# Patient Record
Sex: MALE | Race: BLACK OR AFRICAN AMERICAN | NOT HISPANIC OR LATINO | URBAN - METROPOLITAN AREA
[De-identification: names, ages, dates, MRNs, and addresses within clinical notes are randomized per-mention and may not be internally consistent; named-entity substitution may affect disease eponyms.]

---

## 2021-07-09 ENCOUNTER — EMERGENCY (EMERGENCY)
Facility: HOSPITAL | Age: 36
LOS: 1 days | Discharge: HOME | End: 2021-07-09
Admitting: EMERGENCY MEDICINE
Payer: OTHER GOVERNMENT

## 2021-07-09 VITALS
OXYGEN SATURATION: 97 % | DIASTOLIC BLOOD PRESSURE: 68 MMHG | SYSTOLIC BLOOD PRESSURE: 108 MMHG | TEMPERATURE: 98 F | HEART RATE: 71 BPM | RESPIRATION RATE: 18 BRPM

## 2021-07-09 VITALS
RESPIRATION RATE: 18 BRPM | HEIGHT: 72.83 IN | OXYGEN SATURATION: 99 % | WEIGHT: 187.39 LBS | HEART RATE: 74 BPM | DIASTOLIC BLOOD PRESSURE: 79 MMHG | TEMPERATURE: 98 F | SYSTOLIC BLOOD PRESSURE: 145 MMHG

## 2021-07-09 DIAGNOSIS — M54.5 LOW BACK PAIN: ICD-10-CM

## 2021-07-09 PROCEDURE — 99284 EMERGENCY DEPT VISIT MOD MDM: CPT

## 2021-07-09 PROCEDURE — 99283 EMERGENCY DEPT VISIT LOW MDM: CPT | Mod: 25

## 2021-07-09 PROCEDURE — 96372 THER/PROPH/DIAG INJ SC/IM: CPT | Mod: XU

## 2021-07-09 RX ORDER — IBUPROFEN 200 MG
1 TABLET ORAL
Qty: 15 | Refills: 0
Start: 2021-07-09 | End: 2021-07-13

## 2021-07-09 RX ORDER — CYCLOBENZAPRINE HYDROCHLORIDE 10 MG/1
1 TABLET, FILM COATED ORAL
Qty: 15 | Refills: 0
Start: 2021-07-09 | End: 2021-07-13

## 2021-07-09 RX ORDER — KETOROLAC TROMETHAMINE 30 MG/ML
30 SYRINGE (ML) INJECTION ONCE
Refills: 0 | Status: DISCONTINUED | OUTPATIENT
Start: 2021-07-09 | End: 2021-07-09

## 2021-07-09 RX ORDER — CYCLOBENZAPRINE HYDROCHLORIDE 10 MG/1
10 TABLET, FILM COATED ORAL ONCE
Refills: 0 | Status: COMPLETED | OUTPATIENT
Start: 2021-07-09 | End: 2021-07-09

## 2021-07-09 RX ADMIN — CYCLOBENZAPRINE HYDROCHLORIDE 10 MILLIGRAM(S): 10 TABLET, FILM COATED ORAL at 14:27

## 2021-07-09 RX ADMIN — Medication 30 MILLIGRAM(S): at 14:27

## 2021-07-09 NOTE — ED PROVIDER NOTE - PATIENT PORTAL LINK FT
You can access the FollowMyHealth Patient Portal offered by [facility] by registering at the following website: [email or web address]. By joining [employer]’s FollowMyHealth portal, you will also be able to view your health information using other applications (apps) compatible with our system.

## 2021-07-09 NOTE — ED PROVIDER NOTE - CLINICAL SUMMARY MEDICAL DECISION MAKING FREE TEXT BOX
35 healthy M p/w atraumatic back pain x one month, worse with movement.  no red flag sxs. + reproducible lumbar paraspinal ttp on exam, neg SLR and NVI.  likely lumbar strain.  improved with NSAID/flexeril, will dc with rxs and instructed to f/u outpt

## 2021-07-09 NOTE — ED PROVIDER NOTE - NSFOLLOWUPINSTRUCTIONS_ED_ALL_ED_FT
Take tylenol 650mg or motrin 600mg for pain every 4-6 hours.  You can also take flexeril (muscle relaxer) as prescribed for pain but do not drive/operate heavy machinery as it can make you drowsy    You can apply ice and heat to help decreased pain as well.    Call to make appointment with your primary care doctor within one week    Back Pain    Back pain is very common in adults. The cause of back pain is rarely dangerous and the pain often gets better over time. The cause of your back pain may not be known and may include strain of muscles or ligaments, degeneration of the spinal disks, or arthritis. Occasionally the pain may radiate down your leg(s). Over-the-counter medicines to reduce pain and inflammation are often the most helpful. Stretching and remaining active frequently helps the healing process.     SEEK IMMEDIATE MEDICAL CARE IF YOU HAVE ANY OF THE FOLLOWING SYMPTOMS: bowel or bladder control problems, unusual weakness or numbness in your arms or legs, nausea or vomiting, abdominal pain, fever, dizziness/lightheadedness.

## 2021-07-09 NOTE — ED PROVIDER NOTE - PHYSICAL EXAMINATION
Vitals reviewed  Gen: well appearing, nad, speaking in full sentences  Skin: wwp, no rash/lesions  HEENT: ncat, eomi, mmm  Neck/Back: no midline ttp/step off, + diffuse paraspinal lumbar ttp, neg SLR   CV: rrr, no audible m/r/g  Resp: symmetrical expansion, ctab, no w/r/r  Abd: nondistended, soft/nt  Ext: FROM throughout, no peripheral edema, 5/5 strength all ext, SILT, distal pulses 2+  Neuro: alert/oriented, no focal deficits, steady gait without assistance

## 2021-07-09 NOTE — ED PROVIDER NOTE - OBJECTIVE STATEMENT
35 healthy M p/w back pain x one month.  pt reports dull/aching pain in lower back worse w/ bending forward and turning side to side.  he was taking NSAIDs with some improvement.  This morning pain was worse than normal when getting out of bed.  Has not taken meds today.  States he used to do heavy lifting at the gym but hasn't over past month 2/2 pain.  Denies fever, numbness, weakness, difficulty walking, bowel/bladder dysfunction, dysuria, hematuria, saddle paresthesia, h/o CA, h/o IVDA, fall/trauma
